# Patient Record
Sex: FEMALE | Race: WHITE | Employment: UNEMPLOYED | URBAN - METROPOLITAN AREA
[De-identification: names, ages, dates, MRNs, and addresses within clinical notes are randomized per-mention and may not be internally consistent; named-entity substitution may affect disease eponyms.]

---

## 2022-07-05 ENCOUNTER — OFFICE VISIT (OUTPATIENT)
Dept: RHEUMATOLOGY | Age: 62
End: 2022-07-05
Payer: COMMERCIAL

## 2022-07-05 VITALS
WEIGHT: 258.4 LBS | OXYGEN SATURATION: 98 % | SYSTOLIC BLOOD PRESSURE: 136 MMHG | DIASTOLIC BLOOD PRESSURE: 74 MMHG | HEART RATE: 67 BPM

## 2022-07-05 DIAGNOSIS — M54.16 LUMBAR RADICULOPATHY: ICD-10-CM

## 2022-07-05 DIAGNOSIS — M32.9 SLE (SYSTEMIC LUPUS ERYTHEMATOSUS RELATED SYNDROME) (HCC): Primary | ICD-10-CM

## 2022-07-05 DIAGNOSIS — G60.8 IDIOPATHIC SMALL FIBER SENSORY NEUROPATHY: ICD-10-CM

## 2022-07-05 DIAGNOSIS — M79.605 PAIN IN LEFT LEG: ICD-10-CM

## 2022-07-05 PROCEDURE — 99204 OFFICE O/P NEW MOD 45 MIN: CPT | Performed by: INTERNAL MEDICINE

## 2022-07-05 RX ORDER — MELOXICAM 15 MG/1
15 TABLET ORAL DAILY
COMMUNITY

## 2022-07-05 RX ORDER — PREDNISONE 50 MG/1
50 TABLET ORAL DAILY
Qty: 5 TABLET | Refills: 0 | Status: SHIPPED | OUTPATIENT
Start: 2022-07-05 | End: 2022-07-10

## 2022-07-05 RX ORDER — GABAPENTIN 600 MG/1
TABLET ORAL
Qty: 450 TABLET | Refills: 0 | Status: SHIPPED | OUTPATIENT
Start: 2022-07-05 | End: 2023-07-05

## 2022-07-05 RX ORDER — LEVOTHYROXINE SODIUM 0.12 MG/1
125 TABLET ORAL DAILY
COMMUNITY

## 2022-07-05 RX ORDER — ESTRADIOL 1 MG/1
1 TABLET ORAL DAILY
COMMUNITY

## 2022-07-05 RX ORDER — METHOCARBAMOL 750 MG/1
750 TABLET, FILM COATED ORAL 3 TIMES DAILY
Qty: 60 TABLET | Refills: 0 | Status: SHIPPED | OUTPATIENT
Start: 2022-07-05 | End: 2022-07-25

## 2022-07-05 RX ORDER — HYDROXYCHLOROQUINE SULFATE 200 MG/1
TABLET, FILM COATED ORAL DAILY
COMMUNITY

## 2022-07-05 RX ORDER — DULOXETIN HYDROCHLORIDE 60 MG/1
60 CAPSULE, DELAYED RELEASE ORAL DAILY
Qty: 90 CAPSULE | Refills: 1 | Status: SHIPPED | OUTPATIENT
Start: 2022-07-05 | End: 2022-10-03

## 2022-07-05 RX ORDER — GABAPENTIN 600 MG/1
600 TABLET ORAL 3 TIMES DAILY
COMMUNITY
End: 2022-07-05

## 2022-07-05 RX ORDER — TRAMADOL HYDROCHLORIDE 50 MG/1
50 TABLET ORAL EVERY 6 HOURS PRN
Qty: 360 TABLET | Refills: 0 | Status: SHIPPED | OUTPATIENT
Start: 2022-07-05 | End: 2022-10-03

## 2022-07-05 RX ORDER — DULOXETIN HYDROCHLORIDE 60 MG/1
60 CAPSULE, DELAYED RELEASE ORAL DAILY
COMMUNITY
End: 2022-07-05 | Stop reason: SDUPTHER

## 2022-07-05 RX ORDER — TRAMADOL HYDROCHLORIDE 50 MG/1
50 TABLET ORAL EVERY 6 HOURS PRN
COMMUNITY
End: 2022-07-05 | Stop reason: SDUPTHER

## 2022-07-05 ASSESSMENT — ENCOUNTER SYMPTOMS
VOMITING: 0
SHORTNESS OF BREATH: 0
COUGH: 0
WHEEZING: 0
BLOOD IN STOOL: 0
ABDOMINAL PAIN: 0
NAUSEA: 0

## 2022-07-05 NOTE — PROGRESS NOTES
[facility]) Physicians Rheumatology  Rheumatology Consult Note      7/5/2022       Reason for Consult:  Establish care  Requesting Physician:  Arlene Benítez DO     CHIEF COMPLAINT:    Chief Complaint   Patient presents with    New Patient     SLE organ or system involve unsp. History Obtained From:  patient      HISTORY OF PRESENT ILLNESS:    57 y.o. female presents today to establish care. She was a former patient of my previous practice with [facility]. Elena and h/o SLE,Raynaud's, Sjogren's, and small fiber neuropathy (skin biopsy proven). She is presently on Plaquenil 200 mg bid, Cymbalta and gabapentin 1200 mg twice a day and 600 mg once a day  Past med: Imuran (frequent infections). Has h/o COVID pneumonia in January 2022. She was following with pulmonologist.    She follows with pain management at [facility]. She is receiving injections, epidural injections. Worst pain is in the right lower back and radiates to her right foot. Is unable to sleep on her back. She sleeps on a chair for 30-40 minutes at a time. Also has sharp pain, electric shock-like, radiates from right lateral knee to right ankle. Also has deep \"bruised\" pain. This has been persistent for at least 3-4 months. No skin rash. No oral ulcers. No alopecia. Past Medical History:     has a past medical history of Lupus ([facility] [address] Osteoarthritis, Raynaud's disease, and Rheumatoid arthritis ([facility] [address] Past Surgical History:     has a past surgical history that includes back surgery; lumbar fusion;  Tonsillectomy; and Hysterectomy, total abdominal.    Current Medications:      Current Outpatient Medications:     meloxicam (MOBIC) 15 MG tablet, Take 15 mg by mouth daily, Disp: , Rfl:     estradiol (ESTRACE) 1 MG tablet, Take 1 mg by mouth daily, Disp: , Rfl:     hydroxychloroquine (PLAQUENIL) 200 MG tablet, Take by mouth daily, Disp: , Rfl:     levothyroxine (SYNTHROID) 125 MCG tablet, Take 125 mcg by mouth Daily, Disp: , Rfl:     predniSONE (DELTASONE) 50 MG tablet, Take 1 tablet by mouth daily for 5 days, Disp: 5 tablet, Rfl: 0    methocarbamol (ROBAXIN-750) 750 MG tablet, Take 1 tablet by mouth 3 times daily for 20 days As needed. May cause drowsiness, Disp: 60 tablet, Rfl: 0    gabapentin (NEURONTIN) 600 MG tablet, Take 2 tab twice daily and one tab in afternoon (total 5 tab/day), Disp: 450 tablet, Rfl: 0    DULoxetine (CYMBALTA) 60 MG extended release capsule, Take 1 capsule by mouth daily, Disp: 90 capsule, Rfl: 1    traMADol (ULTRAM) 50 MG tablet, Take 1 tablet by mouth every 6 hours as needed for Pain for up to 90 days. , Disp: 360 tablet, Rfl: 0    Allergies:    Lyrica [pregabalin] and Sulfa antibiotics    Social History:     reports that she has never smoked. She has never used smokeless tobacco. She reports that she does not drink alcohol and does not use drugs. Family History:   family history includes Arthritis in her brother; Cancer in her father; Diabetes in her father; Hypertension in her father; Kidney Cancer in her father; No Known Problems in her mother and sister. REVIEW OF SYSTEMS:    Review of Systems   Constitutional: Positive for fatigue. Negative for chills, fever and unexpected weight change. HENT: Negative for mouth sores. Respiratory: Negative for cough, shortness of breath and wheezing. Cardiovascular: Negative for chest pain and leg swelling. Gastrointestinal: Negative for abdominal pain, blood in stool, nausea and vomiting. Genitourinary: Negative for dysuria and hematuria. Skin: Negative for rash. Neurological: Negative for headaches. PHYSICAL EXAM:    Vitals:    /74 (Site: Left Upper Arm, Position: Sitting, Cuff Size: Large Adult)   Pulse 67   Wt 258 lb 6.4 oz (117.2 kg)   SpO2 98%     Physical Exam  Constitutional:       General: She is not in acute distress. Appearance: Normal appearance.    HENT: Mouth/Throat:      Mouth: Mucous membranes are moist.      Pharynx: Oropharynx is clear. Eyes:      Extraocular Movements: Extraocular movements intact. Conjunctiva/sclera: Conjunctivae normal.   Cardiovascular:      Rate and Rhythm: Normal rate and regular rhythm. Heart sounds: Normal heart sounds. No murmur heard. No friction rub. Pulmonary:      Effort: Pulmonary effort is normal. No respiratory distress. Breath sounds: Normal breath sounds. No wheezing or rhonchi. Abdominal:      Palpations: Abdomen is soft. Musculoskeletal:         General: No swelling, tenderness or deformity. Normal range of motion. Cervical back: Normal range of motion and neck supple. Right lower leg: No edema. Left lower leg: No edema. Comments: No synovitis or tenderness in small joints of hands, wrists, elbows, shoulders. No swelling or tenderness in knees, ankles, MTPs. Good handgrip strength bilateral.  ROM shoulders is intact. Lymphadenopathy:      Cervical: No cervical adenopathy. Skin:     General: Skin is warm and dry. Findings: No lesion or rash. Neurological:      General: No focal deficit present. Mental Status: She is alert and oriented to person, place, and time. Mental status is at baseline. Cranial Nerves: No cranial nerve deficit. Gait: Gait abnormal (due to pain). Psychiatric:         Mood and Affect: Mood normal.            DATA:    Obtain records from [facility]. IMPRESSION/RECOMMENDATIONS:      1. SLE: is stable at this time. -- Continue Plaquenil 200 mg twice daily   -- she is uptodate with eye exams   -- continue meloxicam 15 mg daily     2. Small fiber neuropathy:  -- continue Cymbalta 60 mg daily   -- continue gabapentin 1200 mg am and pm, 600 mg afternoon. 3. Radiculopathy lumbar with acute on chronic pain. Has been following with pain management with little relief.   -- refer to Dr. Nicky Carlton, neurologist at [facility] for evaluation  -- EMG/NCS ordered for left lower extremity. Pt requests to get test done at [facility]  -- trial of prednsione 50 mg daily x 5 days  -- trial of Robaxin 750 tid prn. Discussed with pt the benefits, risks and adverse effects of this medication. Patient expressed understanding.  -- xray of left knee/leg. Pt requests to get imaging done at [facility].    4. Chronic pain related to DDD lumbar spine  -- continue tramadol 50 mg qid for now. Attempted to obtain OARRS today, no report was formulated. RTC in 3 months      Orders Placed This Encounter   Procedures    XR TIBIA FIBULA LEFT (2 VIEWS)     Standing Status:   Future     Standing Expiration Date:   7/5/2023     Order Specific Question:   Reason for exam:     Answer:   pain in left leg    XR KNEE LEFT (3 VIEWS)     Standing Status:   Future     Standing Expiration Date:   7/5/2023     Order Specific Question:   Reason for exam:     Answer:   pain in left leg    CBC with Auto Differential     Standing Status:   Future     Standing Expiration Date:   7/5/2023    Comprehensive Metabolic Panel     Standing Status:   Future     Standing Expiration Date:   7/5/2023    C-Reactive Protein     Standing Status:   Future     Standing Expiration Date:   7/5/2023    Sedimentation Rate     Standing Status:   Future     Standing Expiration Date:   7/5/2023    External Referral To Neurology     Referral Priority:   Routine     Referral Type:   Eval and Treat     Referral Reason:   Specialty Services Required     Requested Specialty:   Neurology     Number of Visits Requested:   1    EMG     Standing Status:   Future     Standing Expiration Date:   9/3/2022     Scheduling Instructions:      Pt requests test done at [place], [place]     Order Specific Question:   Which body part?      Answer:   left lower extremity        Charmayne Bump, MD    [facility] [address]

## 2022-07-21 DIAGNOSIS — M79.605 PAIN IN LEFT LEG: ICD-10-CM

## 2022-07-21 DIAGNOSIS — M54.16 LUMBAR RADICULOPATHY: ICD-10-CM

## 2022-07-25 RX ORDER — METHOCARBAMOL 750 MG/1
750 TABLET, FILM COATED ORAL 2 TIMES DAILY
Qty: 60 TABLET | Refills: 2 | Status: SHIPPED | OUTPATIENT
Start: 2022-07-25 | End: 2022-10-24

## 2022-09-22 NOTE — TELEPHONE ENCOUNTER
Can we get records regarding recent labs.  I need to make sure that Juanita's kidney and liver function are good prior to refilling meloxicam.

## 2022-09-23 RX ORDER — MELOXICAM 15 MG/1
15 TABLET ORAL DAILY
Qty: 30 TABLET | OUTPATIENT
Start: 2022-09-23

## 2022-10-06 NOTE — TELEPHONE ENCOUNTER
We can do video visit. She needs to get the [employer] claire and have internet service. We do not do phone visits.

## 2022-10-06 NOTE — TELEPHONE ENCOUNTER
Joshua Naidu called and said this is the information for the lab where she wants the orders sent, it needs her name and :    Fax # [phone]  [facility]      Patient was asking about the mobic prescription. I let her know that it looks like we are waiting on the labs. She voiced understanding and said that she is going to be getting those done on Monday 10/10/2022. Patient is also wanting to know if her appt on 10/17/2022 can be a telephone visit.  Please advise

## 2022-10-24 DIAGNOSIS — M79.605 PAIN IN LEFT LEG: ICD-10-CM

## 2022-10-24 DIAGNOSIS — M54.16 LUMBAR RADICULOPATHY: ICD-10-CM

## 2022-10-24 RX ORDER — METHOCARBAMOL 750 MG/1
TABLET, FILM COATED ORAL
Qty: 60 TABLET | Refills: 2 | Status: SHIPPED | OUTPATIENT
Start: 2022-10-24

## 2022-12-27 DIAGNOSIS — G60.8 IDIOPATHIC SMALL FIBER SENSORY NEUROPATHY: ICD-10-CM

## 2022-12-28 RX ORDER — DULOXETIN HYDROCHLORIDE 60 MG/1
CAPSULE, DELAYED RELEASE ORAL
Qty: 90 CAPSULE | Refills: 0 | Status: SHIPPED | OUTPATIENT
Start: 2022-12-28

## 2022-12-29 DIAGNOSIS — M79.605 PAIN IN LEFT LEG: ICD-10-CM

## 2022-12-29 DIAGNOSIS — M54.16 LUMBAR RADICULOPATHY: ICD-10-CM

## 2022-12-29 DIAGNOSIS — G60.8 IDIOPATHIC SMALL FIBER SENSORY NEUROPATHY: ICD-10-CM

## 2023-01-03 RX ORDER — GABAPENTIN 600 MG/1
TABLET ORAL
Qty: 450 TABLET | Refills: 0 | Status: SHIPPED | OUTPATIENT
Start: 2023-01-03 | End: 2023-12-29

## 2023-03-22 DIAGNOSIS — G60.8 IDIOPATHIC SMALL FIBER SENSORY NEUROPATHY: ICD-10-CM

## 2023-03-22 RX ORDER — DULOXETIN HYDROCHLORIDE 60 MG/1
CAPSULE, DELAYED RELEASE ORAL
Qty: 90 CAPSULE | Refills: 0 | OUTPATIENT
Start: 2023-03-22

## 2023-03-23 DIAGNOSIS — G60.8 IDIOPATHIC SMALL FIBER SENSORY NEUROPATHY: ICD-10-CM

## 2023-03-27 RX ORDER — DULOXETIN HYDROCHLORIDE 60 MG/1
CAPSULE, DELAYED RELEASE ORAL
Qty: 90 CAPSULE | Refills: 0 | OUTPATIENT
Start: 2023-03-27